# Patient Record
Sex: MALE
[De-identification: names, ages, dates, MRNs, and addresses within clinical notes are randomized per-mention and may not be internally consistent; named-entity substitution may affect disease eponyms.]

---

## 2021-01-08 ENCOUNTER — APPOINTMENT (OUTPATIENT)
Dept: INTERNAL MEDICINE | Facility: CLINIC | Age: 76
End: 2021-01-08
Payer: MEDICARE

## 2021-01-08 ENCOUNTER — NON-APPOINTMENT (OUTPATIENT)
Age: 76
End: 2021-01-08

## 2021-01-08 VITALS
SYSTOLIC BLOOD PRESSURE: 148 MMHG | HEART RATE: 66 BPM | WEIGHT: 189.2 LBS | OXYGEN SATURATION: 96 % | TEMPERATURE: 97.1 F | DIASTOLIC BLOOD PRESSURE: 72 MMHG | BODY MASS INDEX: 31.52 KG/M2 | HEIGHT: 65 IN

## 2021-01-08 VITALS — SYSTOLIC BLOOD PRESSURE: 114 MMHG | DIASTOLIC BLOOD PRESSURE: 62 MMHG

## 2021-01-08 DIAGNOSIS — Z00.00 ENCOUNTER FOR GENERAL ADULT MEDICAL EXAMINATION W/OUT ABNORMAL FINDINGS: ICD-10-CM

## 2021-01-08 DIAGNOSIS — F17.200 NICOTINE DEPENDENCE, UNSPECIFIED, UNCOMPLICATED: ICD-10-CM

## 2021-01-08 PROCEDURE — G0439: CPT

## 2021-01-08 PROCEDURE — 99072 ADDL SUPL MATRL&STAF TM PHE: CPT

## 2021-01-08 RX ORDER — ALENDRONATE SODIUM 70 MG/1
70 TABLET ORAL
Refills: 0 | Status: ACTIVE | COMMUNITY
Start: 2021-01-08

## 2021-01-08 RX ORDER — ESCITALOPRAM OXALATE 20 MG/1
20 TABLET ORAL
Refills: 0 | Status: ACTIVE | COMMUNITY
Start: 2021-01-08

## 2021-01-08 RX ORDER — AMLODIPINE BESYLATE 10 MG/1
10 TABLET ORAL
Qty: 90 | Refills: 1 | Status: ACTIVE | COMMUNITY
Start: 2021-01-08

## 2021-01-08 RX ORDER — ROSUVASTATIN CALCIUM 40 MG/1
40 TABLET, FILM COATED ORAL
Refills: 0 | Status: ACTIVE | COMMUNITY
Start: 2021-01-08

## 2021-01-08 RX ORDER — SILDENAFIL 100 MG/1
100 TABLET, FILM COATED ORAL
Refills: 0 | Status: ACTIVE | COMMUNITY
Start: 2021-01-08

## 2021-01-08 RX ORDER — CHROMIUM 200 MCG
25 MCG TABLET ORAL
Refills: 0 | Status: ACTIVE | COMMUNITY
Start: 2021-01-08

## 2021-01-08 RX ORDER — ESOMEPRAZOLE MAGNESIUM 20 MG/1
20 CAPSULE, DELAYED RELEASE ORAL
Refills: 0 | Status: ACTIVE | COMMUNITY
Start: 2021-01-08

## 2021-01-08 RX ORDER — METFORMIN HYDROCHLORIDE 500 MG/1
500 TABLET, COATED ORAL
Qty: 30 | Refills: 1 | Status: ACTIVE | COMMUNITY
Start: 2021-01-08

## 2021-01-08 RX ORDER — LISINOPRIL 40 MG/1
40 TABLET ORAL
Refills: 0 | Status: ACTIVE | COMMUNITY
Start: 2021-01-08

## 2021-01-08 NOTE — PHYSICAL EXAM
[No Acute Distress] : no acute distress [Well Nourished] : well nourished [Well Developed] : well developed [Well-Appearing] : well-appearing [Normal Sclera/Conjunctiva] : normal sclera/conjunctiva [PERRL] : pupils equal round and reactive to light [EOMI] : extraocular movements intact [Normal Outer Ear/Nose] : the outer ears and nose were normal in appearance [Normal Oropharynx] : the oropharynx was normal [Supple] : supple [No Respiratory Distress] : no respiratory distress  [No Accessory Muscle Use] : no accessory muscle use [Clear to Auscultation] : lungs were clear to auscultation bilaterally [Normal Rate] : normal rate  [Regular Rhythm] : with a regular rhythm [Normal S1, S2] : normal S1 and S2 [No Edema] : there was no peripheral edema [Soft] : abdomen soft [Non Tender] : non-tender [Non-distended] : non-distended [Normal Bowel Sounds] : normal bowel sounds [Normal Posterior Cervical Nodes] : no posterior cervical lymphadenopathy [Normal Anterior Cervical Nodes] : no anterior cervical lymphadenopathy [No CVA Tenderness] : no CVA  tenderness [No Spinal Tenderness] : no spinal tenderness [No Joint Swelling] : no joint swelling [Grossly Normal Strength/Tone] : grossly normal strength/tone [No Rash] : no rash [Coordination Grossly Intact] : coordination grossly intact [No Focal Deficits] : no focal deficits [Normal Gait] : normal gait [Normal Affect] : the affect was normal [Normal Insight/Judgement] : insight and judgment were intact [Alert and Oriented x3] : oriented to person, place, and time

## 2021-01-12 NOTE — HEALTH RISK ASSESSMENT
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

## 2021-01-12 NOTE — HISTORY OF PRESENT ILLNESS
[de-identified] : \par 75 year old male with a history of hypertension, hyperlipidemia, diabetes, s/p back surgery in 2020, osteoporosis presents for evaluation and to establish care. He feels well and denies any chest pain, shortness of breath, palpitations, headaches/dizziness, fever/chills, N/V/ abdominal pain. He states he has been experiencing urinary incontinence/urgency. He has been taking Oxybutynin but it has not been working for him.

## 2021-01-12 NOTE — PLAN
[FreeTextEntry1] : \par 1. Annual Physical Exam\par complete bloodwork today, including COVID antibodies\par UTD with flu vaccine, PNA vaccine and colonoscopy\par \par 2. Diabetes\par last eye exam 6 months ago\par cont. Metformin 500mg daily\par low carb/ low sugar diet\par \par 3. Hypertension\par cont. Amlodipine 10mg daily\par Lisinopril 40mg daily\par low sodium diet\par \par 4. Hyperlipidemia\par cont. Rosuvastatin 40mg daily\par low fat/ low cholesterol diet\par \par 5. Urinary Incontinence\par cont. Oxybutynin\par urology referral given\par UA/  UC today

## 2021-01-19 LAB
25(OH)D3 SERPL-MCNC: 49 NG/ML
ALBUMIN SERPL ELPH-MCNC: 4.9 G/DL
ALP BLD-CCNC: 46 U/L
ALT SERPL-CCNC: 11 U/L
ANION GAP SERPL CALC-SCNC: 16 MMOL/L
APPEARANCE: CLEAR
AST SERPL-CCNC: 16 U/L
BACTERIA UR CULT: ABNORMAL
BACTERIA: NEGATIVE
BASOPHILS # BLD AUTO: 0.08 K/UL
BASOPHILS NFR BLD AUTO: 0.8 %
BILIRUB SERPL-MCNC: 0.3 MG/DL
BILIRUBIN URINE: NEGATIVE
BLOOD URINE: ABNORMAL
BUN SERPL-MCNC: 26 MG/DL
CALCIUM SERPL-MCNC: 10.2 MG/DL
CHLORIDE SERPL-SCNC: 101 MMOL/L
CHOLEST SERPL-MCNC: 152 MG/DL
CO2 SERPL-SCNC: 25 MMOL/L
COLOR: NORMAL
CREAT SERPL-MCNC: 1.28 MG/DL
EOSINOPHIL # BLD AUTO: 0.39 K/UL
EOSINOPHIL NFR BLD AUTO: 4.1 %
ESTIMATED AVERAGE GLUCOSE: 137 MG/DL
GLUCOSE QUALITATIVE U: NEGATIVE
GLUCOSE SERPL-MCNC: 101 MG/DL
HBA1C MFR BLD HPLC: 6.4 %
HCT VFR BLD CALC: 46.6 %
HDLC SERPL-MCNC: 47 MG/DL
HGB BLD-MCNC: 14.6 G/DL
HYALINE CASTS: 0 /LPF
IMM GRANULOCYTES NFR BLD AUTO: 0.3 %
KETONES URINE: NEGATIVE
LDLC SERPL CALC-MCNC: 64 MG/DL
LEUKOCYTE ESTERASE URINE: NEGATIVE
LYMPHOCYTES # BLD AUTO: 3.91 K/UL
LYMPHOCYTES NFR BLD AUTO: 41.4 %
MAN DIFF?: NORMAL
MCHC RBC-ENTMCNC: 27.6 PG
MCHC RBC-ENTMCNC: 31.3 GM/DL
MCV RBC AUTO: 88.1 FL
MICROSCOPIC-UA: NORMAL
MONOCYTES # BLD AUTO: 0.7 K/UL
MONOCYTES NFR BLD AUTO: 7.4 %
NEUTROPHILS # BLD AUTO: 4.34 K/UL
NEUTROPHILS NFR BLD AUTO: 46 %
NITRITE URINE: NEGATIVE
NONHDLC SERPL-MCNC: 105 MG/DL
PH URINE: 6
PLATELET # BLD AUTO: 226 K/UL
POTASSIUM SERPL-SCNC: 4.4 MMOL/L
PROT SERPL-MCNC: 7.9 G/DL
PROTEIN URINE: NEGATIVE
RBC # BLD: 5.29 M/UL
RBC # FLD: 13.4 %
RED BLOOD CELLS URINE: 3 /HPF
SARS-COV-2 IGG SERPL IA-ACNC: <0.1 INDEX
SARS-COV-2 IGG SERPL QL IA: NEGATIVE
SODIUM SERPL-SCNC: 141 MMOL/L
SPECIFIC GRAVITY URINE: 1.02
SQUAMOUS EPITHELIAL CELLS: 1 /HPF
TRIGL SERPL-MCNC: 206 MG/DL
TSH SERPL-ACNC: 1.45 UIU/ML
UROBILINOGEN URINE: NORMAL
VIT B12 SERPL-MCNC: 790 PG/ML
WBC # FLD AUTO: 9.45 K/UL
WHITE BLOOD CELLS URINE: 0 /HPF

## 2021-01-22 ENCOUNTER — TRANSCRIPTION ENCOUNTER (OUTPATIENT)
Age: 76
End: 2021-01-22

## 2021-01-26 RX ORDER — BLOOD-GLUCOSE METER
W/DEVICE KIT MISCELLANEOUS
Qty: 1 | Refills: 1 | Status: ACTIVE | COMMUNITY
Start: 2021-01-25 | End: 1900-01-01

## 2021-01-26 RX ORDER — LANCETS
EACH MISCELLANEOUS
Qty: 1 | Refills: 1 | Status: ACTIVE | COMMUNITY
Start: 2021-01-25 | End: 1900-01-01

## 2021-01-26 RX ORDER — BLOOD-GLUCOSE METER
KIT MISCELLANEOUS
Qty: 1 | Refills: 3 | Status: ACTIVE | COMMUNITY
Start: 2021-01-25 | End: 1900-01-01

## 2021-02-18 ENCOUNTER — APPOINTMENT (OUTPATIENT)
Dept: UROLOGY | Facility: CLINIC | Age: 76
End: 2021-02-18
Payer: MEDICARE

## 2021-02-18 VITALS
HEART RATE: 74 BPM | SYSTOLIC BLOOD PRESSURE: 146 MMHG | HEIGHT: 66 IN | WEIGHT: 189 LBS | DIASTOLIC BLOOD PRESSURE: 76 MMHG | TEMPERATURE: 97.8 F | BODY MASS INDEX: 30.37 KG/M2 | RESPIRATION RATE: 17 BRPM

## 2021-02-18 DIAGNOSIS — R32 UNSPECIFIED URINARY INCONTINENCE: ICD-10-CM

## 2021-02-18 PROCEDURE — 99072 ADDL SUPL MATRL&STAF TM PHE: CPT

## 2021-02-18 PROCEDURE — 99205 OFFICE O/P NEW HI 60 MIN: CPT

## 2021-02-18 PROCEDURE — 88112 CYTOPATH CELL ENHANCE TECH: CPT | Mod: 26

## 2021-02-20 LAB
APPEARANCE: CLEAR
BACTERIA UR CULT: NORMAL
BACTERIA: NEGATIVE
BILIRUBIN URINE: NEGATIVE
BLOOD URINE: ABNORMAL
COLOR: YELLOW
GLUCOSE QUALITATIVE U: NEGATIVE
HYALINE CASTS: 0 /LPF
KETONES URINE: NEGATIVE
LEUKOCYTE ESTERASE URINE: ABNORMAL
MICROSCOPIC-UA: NORMAL
NITRITE URINE: NEGATIVE
PH URINE: 6
PROTEIN URINE: ABNORMAL
PSA SERPL-MCNC: 1.67 NG/ML
RED BLOOD CELLS URINE: 4 /HPF
SPECIFIC GRAVITY URINE: 1.02
SQUAMOUS EPITHELIAL CELLS: 4 /HPF
URINE CYTOLOGY: NORMAL
UROBILINOGEN URINE: NORMAL
WHITE BLOOD CELLS URINE: 59 /HPF

## 2021-02-20 NOTE — LETTER BODY
[Dear  ___] : Dear  [unfilled], [Consult Letter:] : I had the pleasure of evaluating your patient, [unfilled]. [Please see my note below.] : Please see my note below. [Consult Closing:] : Thank you very much for allowing me to participate in the care of this patient.  If you have any questions, please do not hesitate to contact me. [Sincerely,] : Sincerely, [FreeTextEntry2] : Dr. Susanna Templeton\[facility] [address] [place] [address]\[place] [id number] [FreeTextEntry1] : 02/18/2021: Mr. Torres is a 76y/o M presenting today for evaluation of urinary incontinence. Admits urinary urgency and incontinence which has worsened in the past 6 months. Also complains of burning on tip of penis when urinating. Admits weak urinary stream. Wakes 2-4x at night to urinate. Has had cystoscopy in the past which he tolerated. On 1/8/21, pt was given Augmentin by PCP due to positive growth in urine culture, although patient was not symptomatic. Not sexually active due to weak sexual function. Has tried Tadalafil without improvement. Had procedure on prostate about 10 years ago which has since weakened his sexual function. Pt reports his testosterone is low. H/o diabetes, 1/8/21 HbA1c was 6.4. No h/o kidney stones. No FHx of lung or bladder cancer. Former smoker for 30 years. \par \par Patient will schedule for cystoscopy to evaluate his urinary incontinence. \par \par I prescribed the patient Tamsulosin 0.4 mg once daily half hour after a meal for urinary incontinence. I advised the patient the side effects of nasal congestion, light-headedness, and lack of seminal emission. \par \par Defer digital rectal exam until urine results confirm there is no infection.\par \par The patient produced a urine sample which will be sent for urinalysis, urine cytology, and urine culture. \par Blood work today includes bioavailable testosterone. \par \par RTO in 1 week for cystoscopy.  [FreeTextEntry3] : Neto Sterling MD, PhD

## 2021-02-20 NOTE — ASSESSMENT
[FreeTextEntry1] : 02/18/2021: Mr. Torres is a 76y/o M presenting today for evaluation of urinary incontinence. Admits urinary urgency and incontinence which has worsened in the past 6 months. Also complains of burning on tip of penis when urinating. Admits weak urinary stream. Wakes 2-4x at night to urinate. Has had cystoscopy in the past which he tolerated. On 1/8/21, pt was given Augmentin by PCP due to positive growth in urine culture, although patient was not symptomatic. Not sexually active due to weak sexual function. Has tried Tadalafil without improvement. Had procedure on prostate about 10 years ago which has since weakened his sexual function. Pt reports his testosterone is low. H/o diabetes, 1/8/21 HbA1c was 6.4. No h/o kidney stones. No FHx of lung or bladder cancer. Former smoker for 30 years. \par \par Patient will schedule for cystoscopy to evaluate his urinary incontinence. \par \par I prescribed the patient Tamsulosin 0.4 mg once daily half hour after a meal for urinary incontinence. I advised the patient the side effects of nasal congestion, light-headedness, and lack of seminal emission. \par \par Defer digital rectal exam until urine results confirm there is no infection.\par \par The patient produced a urine sample which will be sent for urinalysis, urine cytology, and urine culture. \par Blood work today includes bioavailable testosterone. \par \par Urinalysis from today shows Pyuria.\par \par RTO in 1 week for cystoscopy. \par \par Preparation, in-person office visit, and coordination of care took: 60 minutes

## 2021-02-20 NOTE — LETTER HEADER
[FreeTextEntry3] : Neto Sterling MD, PhD\par [facility]\par [address]\par [address] [place] [address]\[place] [address]

## 2021-02-20 NOTE — ADDENDUM
[FreeTextEntry1] : I, Akilah Bhatiain, acted solely as a scribe for Dr. Neto Sterling on this date 02/18/2021.\par \par All medical record entries made by the Scribe were at my, Dr. Neto Sterling, direction and personally dictated by me on 02/18/2021. I have reviewed the chart and agree that the record accurately reflects my personal performance of the history, physical exam, assessment and plan.  I have also personally directed, reviewed and agreed with the chart.

## 2021-02-20 NOTE — PHYSICAL EXAM
[General Appearance - Well Developed] : well developed [Normal Appearance] : normal appearance [General Appearance - In No Acute Distress] : no acute distress [Edema] : no peripheral edema [Respiration, Rhythm And Depth] : normal respiratory rhythm and effort [Exaggerated Use Of Accessory Muscles For Inspiration] : no accessory muscle use [Abdomen Soft] : soft [Abdomen Tenderness] : non-tender [Normal Station and Gait] : the gait and station were normal for the patient's age [] : no rash [No Focal Deficits] : no focal deficits [Oriented To Time, Place, And Person] : oriented to person, place, and time [Affect] : the affect was normal [Mood] : the mood was normal [Not Anxious] : not anxious [FreeTextEntry1] : wears eyeglasses

## 2021-02-20 NOTE — HISTORY OF PRESENT ILLNESS
[FreeTextEntry1] : 02/18/2021: Mr. Torres is a 74y/o M presenting today for evaluation of urinary incontinence. Admits urinary urgency and incontinence which has worsened in the past 6 months. Also complains of burning on tip of penis when urinating. Admits weak urinary stream. Wakes 2-4x at night to urinate. Has had cystoscopy in the past which he tolerated. On 1/8/21, pt was given Augmentin by PCP due to positive growth in urine culture, although patient was not symptomatic. Not sexually active due to weak sexual function. Has tried Tadalafil without improvement. Had procedure on prostate about 10 years ago which has since weakened his sexual function. Pt reports his testosterone is low. H/o diabetes, 1/8/21 HbA1c was 6.4. No h/o kidney stones. No FHx of lung or bladder cancer. Former smoker for 30 years.

## 2021-02-24 ENCOUNTER — NON-APPOINTMENT (OUTPATIENT)
Age: 76
End: 2021-02-24

## 2021-02-25 ENCOUNTER — NON-APPOINTMENT (OUTPATIENT)
Age: 76
End: 2021-02-25

## 2021-02-25 ENCOUNTER — APPOINTMENT (OUTPATIENT)
Dept: UROLOGY | Facility: CLINIC | Age: 76
End: 2021-02-25
Payer: MEDICARE

## 2021-02-25 ENCOUNTER — OUTPATIENT (OUTPATIENT)
Dept: OUTPATIENT SERVICES | Facility: HOSPITAL | Age: 76
LOS: 1 days | End: 2021-02-25
Payer: MEDICARE

## 2021-02-25 VITALS — TEMPERATURE: 96.7 F | DIASTOLIC BLOOD PRESSURE: 77 MMHG | HEART RATE: 77 BPM | SYSTOLIC BLOOD PRESSURE: 155 MMHG

## 2021-02-25 DIAGNOSIS — R35.0 FREQUENCY OF MICTURITION: ICD-10-CM

## 2021-02-25 LAB
TESTOST BND SERPL-MCNC: 8.74 NG/DL
TESTOSTERONE BIOAVAILABLE: 90 NG/DL
TESTOSTERONE TOTAL S: 257 NG/DL

## 2021-02-25 PROCEDURE — 52000 CYSTOURETHROSCOPY: CPT

## 2021-02-25 PROCEDURE — 88112 CYTOPATH CELL ENHANCE TECH: CPT | Mod: 26

## 2021-02-25 PROCEDURE — 99214 OFFICE O/P EST MOD 30 MIN: CPT | Mod: 25

## 2021-02-25 PROCEDURE — 99072 ADDL SUPL MATRL&STAF TM PHE: CPT

## 2021-02-28 LAB
APPEARANCE: CLEAR
BACTERIA UR CULT: NORMAL
BACTERIA: NEGATIVE
BILIRUBIN URINE: NEGATIVE
BLOOD URINE: NORMAL
COLOR: NORMAL
GLUCOSE QUALITATIVE U: NEGATIVE
HYALINE CASTS: 0 /LPF
KETONES URINE: NEGATIVE
LEUKOCYTE ESTERASE URINE: NEGATIVE
MICROSCOPIC-UA: NORMAL
NITRITE URINE: NEGATIVE
PH URINE: 6
PROTEIN URINE: NORMAL
RED BLOOD CELLS URINE: 1 /HPF
SPECIFIC GRAVITY URINE: 1.02
SQUAMOUS EPITHELIAL CELLS: 1 /HPF
UROBILINOGEN URINE: NORMAL
WHITE BLOOD CELLS URINE: 1 /HPF

## 2021-03-02 ENCOUNTER — NON-APPOINTMENT (OUTPATIENT)
Age: 76
End: 2021-03-02

## 2021-03-02 LAB
APPEARANCE: CLEAR
BACTERIA: NEGATIVE
BILIRUBIN URINE: NEGATIVE
BLOOD URINE: NEGATIVE
COLOR: NORMAL
GLUCOSE QUALITATIVE U: NEGATIVE
HYALINE CASTS: 0 /LPF
KETONES URINE: NEGATIVE
LEUKOCYTE ESTERASE URINE: NEGATIVE
MICROSCOPIC-UA: NORMAL
NITRITE URINE: NEGATIVE
PH URINE: 6
PROTEIN URINE: NEGATIVE
RED BLOOD CELLS URINE: 2 /HPF
SPECIFIC GRAVITY URINE: 1.02
SQUAMOUS EPITHELIAL CELLS: 0 /HPF
URINE CYTOLOGY: NORMAL
URINE CYTOLOGY: NORMAL
UROBILINOGEN URINE: NORMAL
WHITE BLOOD CELLS URINE: 1 /HPF

## 2021-03-03 ENCOUNTER — NON-APPOINTMENT (OUTPATIENT)
Age: 76
End: 2021-03-03

## 2021-03-03 LAB — BACTERIA UR CULT: NORMAL

## 2021-03-06 NOTE — HISTORY OF PRESENT ILLNESS
[FreeTextEntry1] : 02/18/2021: Mr. Torres is a 76y/o M presenting today for evaluation of urinary incontinence. Admits urinary urgency and incontinence which has worsened in the past 6 months. Also complains of burning on tip of penis when urinating. Admits weak urinary stream. Wakes 2-4x at night to urinate. Has had cystoscopy in the past which he tolerated. On 1/8/21, pt was given Augmentin by PCP due to positive growth in urine culture, although patient was not symptomatic. Not sexually active due to weak sexual function. Has tried Tadalafil without improvement. Had procedure on prostate about 10 years ago which has since weakened his sexual function. Pt reports his testosterone is low. H/o diabetes, 1/8/21 HbA1c was 6.4. No h/o kidney stones. No FHx of lung or bladder cancer. Former smoker for 30 years. \par \par 02/25/2021: Patient presents today for cystoscopy. Feels burning after the cystoscopy and was only able to urinate a small volume. Hx of DM. Reviewed 2/18/21 urine tests. UA showed pyuria, UC&S no growth.

## 2021-03-06 NOTE — ASSESSMENT
[FreeTextEntry1] : 02/18/2021: Mr. Torres is a 76y/o M presenting today for evaluation of urinary incontinence. Admits urinary urgency and incontinence which has worsened in the past 6 months. Also complains of burning on tip of penis when urinating. Admits weak urinary stream. Wakes 2-4x at night to urinate. Has had cystoscopy in the past which he tolerated. On 1/8/21, pt was given Augmentin by PCP due to positive growth in urine culture, although patient was not symptomatic. Not sexually active due to weak sexual function. Has tried Tadalafil without improvement. Had procedure on prostate about 10 years ago which has since weakened his sexual function. Pt reports his testosterone is low. H/o diabetes, 1/8/21 HbA1c was 6.4. No h/o kidney stones. No FHx of lung or bladder cancer. Former smoker for 30 years. \par \par 02/25/2021: Patient presents today for cystoscopy. Feels burning after the cystoscopy and was only able to urinate a small volume. Hx of DM. Reviewed 2/18/21 urine tests. UA showed pyuria, UC&S no growth. \par \par Cystoscopy findings: Bladder mucosa is normal pale pink. Scope is retroflexed. Prostate does protrude into the bladder. Bladder neck has been previously resected. Trigone is visible. Bladder is 2+ trabeculated. Good capacity. R ureteral orifice is a slit and close to the prostate. Left ureteral orifice is a slit and close to the prostate. No bladder tumors or stones. Slight prostate urethral glandularis posteriorly near bladder neck. Bladder neck is open. No urethral strictures, tumors, inflammation, or stones. \par \par Two tablets of Bactrim are provided. Patient is to take one tablet now and one tablet tonight to limit their risk of infection. \par \par Patient was able to urinate later in the day after the procedure without issue. \par \par The patient produced a urine sample which will be sent for urinalysis, urine cytology, and urine culture. \par \par Preparation, in-person office visit, and coordination of care took: 30 minutes

## 2021-03-06 NOTE — ADDENDUM
[FreeTextEntry1] : I, Aiklah Bhatiain, acted solely as a scribe for Dr. Neto Sterling on this date 02/25/2021.\par \par All medical record entries made by the Scribe were at my, Dr. Neto Sterling, direction and personally dictated by me on 02/25/2021. I have reviewed the chart and agree that the record accurately reflects my personal performance of the history, physical exam, assessment and plan.  I have also personally directed, reviewed and agreed with the chart.

## 2021-03-08 DIAGNOSIS — N52.1 ERECTILE DYSFUNCTION DUE TO DISEASES CLASSIFIED ELSEWHERE: ICD-10-CM

## 2021-03-08 DIAGNOSIS — R82.81 PYURIA: ICD-10-CM

## 2021-03-08 DIAGNOSIS — N39.0 URINARY TRACT INFECTION, SITE NOT SPECIFIED: ICD-10-CM

## 2021-03-08 DIAGNOSIS — E11.9 TYPE 2 DIABETES MELLITUS WITHOUT COMPLICATIONS: ICD-10-CM

## 2021-03-08 DIAGNOSIS — N39.41 URGE INCONTINENCE: ICD-10-CM

## 2021-03-12 ENCOUNTER — APPOINTMENT (OUTPATIENT)
Dept: UROLOGY | Facility: CLINIC | Age: 76
End: 2021-03-12
Payer: MEDICARE

## 2021-03-12 PROCEDURE — 99442: CPT

## 2021-03-12 RX ORDER — TAMSULOSIN HYDROCHLORIDE 0.4 MG/1
0.4 CAPSULE ORAL
Qty: 30 | Refills: 0 | Status: ACTIVE | COMMUNITY
Start: 2021-02-18 | End: 1900-01-01

## 2021-03-12 NOTE — ASSESSMENT
[FreeTextEntry1] : 02/18/2021: Mr. Torres is a 76y/o M presenting today for evaluation of urinary incontinence. Admits urinary urgency and incontinence which has worsened in the past 6 months. Also complains of burning on tip of penis when urinating. Admits weak urinary stream. Wakes 2-4x at night to urinate. Has had cystoscopy in the past which he tolerated. On 1/8/21, pt was given Augmentin by PCP due to positive growth in urine culture, although patient was not symptomatic. Not sexually active due to weak sexual function. Has tried Tadalafil without improvement. Had procedure on prostate about 10 years ago which has since weakened his sexual function. Pt reports his testosterone is low. H/o diabetes, 1/8/21 HbA1c was 6.4. No h/o kidney stones. No FHx of lung or bladder cancer. Former smoker for 30 years. \par \par 02/25/2021: Patient presents today for cystoscopy. Feels burning after the cystoscopy and was only able to urinate a small volume. Hx of DM. Reviewed 2/18/21 urine tests. UA showed pyuria, UC&S no growth. \par \par Cystoscopy findings: Bladder mucosa is normal pale pink. Scope is retroflexed. Prostate does protrude into the bladder. Bladder neck has been previously resected. Trigone is visible. Bladder is 2+ trabeculated. Good capacity. R ureteral orifice is a slit and close to the prostate. Left ureteral orifice is a slit and close to the prostate. No bladder tumors or stones. Slight prostate urethral glandularis posteriorly near bladder neck. Bladder neck is open. No urethral strictures, tumors, inflammation, or stones. \par \par Two tablets of Bactrim are provided. Patient is to take one tablet now and one tablet tonight to limit their risk of infection. \par Patient was able to urinate later in the day after the procedure without issue. \par The patient produced a urine sample which will be sent for urinalysis, urine cytology, and urine culture. \par \par 03/12/2021: The patient presents today for a telehealth visit. [facility] was having some problems, but [facility] 10 [facility] once daily which gave him improvement. He continues to take tamsulosin 0.4 mg once daily. Pt reports he had to wear diapers after the cystoscopy, but his urination is about 80% improved. The patient is still interested in something for his erections once his urinary symptoms are under control. \par \par I increased the dosage of Tamsulosin 0.4 mg from once daily to twice daily. \par \par Patient will have a telehealth visit in 2 weeks to evaluate the efficacy of tamsulosin BID. \par \par Preparation, telephone visit, and coordination of care took : 11 minutes.

## 2021-03-12 NOTE — HISTORY OF PRESENT ILLNESS
[FreeTextEntry1] : 02/18/2021: Mr. Torres is a 76y/o M presenting today for evaluation of urinary incontinence. Admits urinary urgency and incontinence which has worsened in the past 6 months. Also complains of burning on tip of penis when urinating. Admits weak urinary stream. Wakes 2-4x at night to urinate. Has had cystoscopy in the past which he tolerated. On 1/8/21, pt was given Augmentin by PCP due to positive growth in urine culture, although patient was not symptomatic. Not sexually active due to weak sexual function. Has tried Tadalafil without improvement. Had procedure on prostate about 10 years ago which has since weakened his sexual function. Pt reports his testosterone is low. H/o diabetes, 1/8/21 HbA1c was 6.4. No h/o kidney stones. No FHx of lung or bladder cancer. Former smoker for 30 years. \par \par 02/25/2021: Patient presents today for cystoscopy. Feels burning after the cystoscopy and was only able to urinate a small volume. Hx of DM. Reviewed 2/18/21 urine tests. UA showed pyuria, UC&S no growth. \par \par 03/12/2021: The patient presents today for a telehealth visit. Pt was having some problems, but [facility] 10 mg once daily which gave him improvement. He continues to take tamsulosin 0.4 mg once daily. Pt reports he had to wear diapers after the cystoscopy, but his urination is about 80% improved. The patient is still interested in something for his erections once his urinary symptoms are under control.

## 2021-03-12 NOTE — ADDENDUM
[FreeTextEntry1] : This note was authored by Esperanza Berg working as a scribe for Dr.Gary Sterling. I, Dr. Neto Sterling have reviewed the content of this note and confirm it is true and accurate. I personally performed the history and physical examination and made all the decisions 03/12/2021.

## 2021-03-19 LAB — FUNGUS SPEC CULT ORG #8: NORMAL

## 2021-04-08 ENCOUNTER — APPOINTMENT (OUTPATIENT)
Dept: UROLOGY | Facility: CLINIC | Age: 76
End: 2021-04-08
Payer: MEDICARE

## 2021-04-08 DIAGNOSIS — N39.0 URINARY TRACT INFECTION, SITE NOT SPECIFIED: ICD-10-CM

## 2021-04-08 DIAGNOSIS — N39.41 URGE INCONTINENCE: ICD-10-CM

## 2021-04-08 DIAGNOSIS — N40.0 BENIGN PROSTATIC HYPERPLASIA WITHOUT LOWER URINARY TRACT SYMPMS: ICD-10-CM

## 2021-04-08 DIAGNOSIS — N52.1 ERECTILE DYSFUNCTION DUE TO DISEASES CLASSIFIED ELSEWHERE: ICD-10-CM

## 2021-04-08 DIAGNOSIS — A49.9 URINARY TRACT INFECTION, SITE NOT SPECIFIED: ICD-10-CM

## 2021-04-08 DIAGNOSIS — R82.81 PYURIA: ICD-10-CM

## 2021-04-08 PROCEDURE — 99072 ADDL SUPL MATRL&STAF TM PHE: CPT

## 2021-04-08 PROCEDURE — 88112 CYTOPATH CELL ENHANCE TECH: CPT | Mod: 26

## 2021-04-08 PROCEDURE — 99214 OFFICE O/P EST MOD 30 MIN: CPT

## 2021-04-08 RX ORDER — OXYBUTYNIN CHLORIDE 15 MG/1
15 TABLET, EXTENDED RELEASE ORAL DAILY
Qty: 30 | Refills: 11 | Status: ACTIVE | COMMUNITY
Start: 2021-01-08 | End: 1900-01-01

## 2021-04-11 LAB
APPEARANCE: CLEAR
BACTERIA UR CULT: NORMAL
BACTERIA: NEGATIVE
BILIRUBIN URINE: NEGATIVE
BLOOD URINE: NORMAL
COLOR: NORMAL
GLUCOSE QUALITATIVE U: NEGATIVE
HYALINE CASTS: 0 /LPF
KETONES URINE: NEGATIVE
LEUKOCYTE ESTERASE URINE: NEGATIVE
MICROSCOPIC-UA: NORMAL
NITRITE URINE: NEGATIVE
PH URINE: 7
PROTEIN URINE: NEGATIVE
RED BLOOD CELLS URINE: 2 /HPF
SPECIFIC GRAVITY URINE: 1.01
SQUAMOUS EPITHELIAL CELLS: 0 /HPF
URINE CYTOLOGY: NORMAL
UROBILINOGEN URINE: NORMAL
WHITE BLOOD CELLS URINE: 0 /HPF

## 2021-04-20 LAB — ACID FAST STN UR: NORMAL

## 2021-04-24 LAB
ACID FAST STN UR: NORMAL

## 2021-05-01 NOTE — ASSESSMENT
[FreeTextEntry1] : 02/18/2021: Mr. Torres is a 74y/o M presenting today for evaluation of urinary incontinence. Admits urinary urgency and incontinence which has worsened in the past 6 months. Also complains of burning on tip of penis when urinating. Admits weak urinary stream. Wakes 2-4x at night to urinate. Has had cystoscopy in the past which he tolerated. On 1/8/21, pt was given Augmentin by PCP due to positive growth in urine culture, although patient was not symptomatic. Not sexually active due to weak sexual function. Has tried Tadalafil without improvement. Had procedure on prostate about 10 years ago which has since weakened his sexual function. Pt reports his testosterone is low. H/o diabetes, 1/8/21 HbA1c was 6.4. No h/o kidney stones. No FHx of lung or bladder cancer. Former smoker for 30 years. \par \par 02/25/2021: Patient presents today for cystoscopy. Feels burning after the cystoscopy and was only able to urinate a small volume. Hx of DM. Reviewed 2/18/21 urine tests. UA showed pyuria, UC&S no growth. \par \par 03/12/2021: The patient presents today for a telehealth visit. Pt was having some problems, but [facility] 10 mg once daily which gave him improvement. He continues to take tamsulosin 0.4 mg once daily. Pt reports he had to wear diapers after the cystoscopy, but his urination is about 80% improved. The patient is still interested in something for his erections once his urinary symptoms are under control. \par \par 04/08/2021: Patient presents today for follow up. Last visit, pt was increased on Tamsulosin to BID to decrease urinary urgency, but did not have any improvement. Takes Oxybutynin ER 10mg which does help, but still having urinary urgency though not as bad as before. No constipation or dry mouth. On Sildenafil 100mg one hour prior to sexual activity, however erections continue to be weak. No side effects. \par \par Increased Oxybutynin ER from 10mg to 15mg once daily. \par Increase Sildenafil 100mg to 1.5 tablets one hour prior to sexual activity. \par \par The patient produced a urine sample which will be sent for urinalysis, urine cytology, and urine culture. \par \par RTO in 3 weeks for reassessment. \par \par Preparation, in-person office visit, and coordination of care took: 30 minutes

## 2021-05-01 NOTE — ADDENDUM
[FreeTextEntry1] : I, Akilah Bhatiain, acted solely as a scribe for Dr. Neto Sterling on this date 04/08/2021.\par \par All medical record entries made by the Scribe were at my, Dr. Neto Sterling, direction and personally dictated by me on 04/08/2021. I have reviewed the chart and agree that the record accurately reflects my personal performance of the history, physical exam, assessment and plan.  I have also personally directed, reviewed and agreed with the chart.

## 2021-05-01 NOTE — HISTORY OF PRESENT ILLNESS
[FreeTextEntry1] : 02/18/2021: Mr. Torres is a 74y/o M presenting today for evaluation of urinary incontinence. Admits urinary urgency and incontinence which has worsened in the past 6 months. Also complains of burning on tip of penis when urinating. Admits weak urinary stream. Wakes 2-4x at night to urinate. Has had cystoscopy in the past which he tolerated. On 1/8/21, pt was given Augmentin by PCP due to positive growth in urine culture, although patient was not symptomatic. Not sexually active due to weak sexual function. Has tried Tadalafil without improvement. Had procedure on prostate about 10 years ago which has since weakened his sexual function. Pt reports his testosterone is low. H/o diabetes, 1/8/21 HbA1c was 6.4. No h/o kidney stones. No FHx of lung or bladder cancer. Former smoker for 30 years. \par \par 02/25/2021: Patient presents today for cystoscopy. Feels burning after the cystoscopy and was only able to urinate a small volume. Hx of DM. Reviewed 2/18/21 urine tests. UA showed pyuria, UC&S no growth. \par \par 03/12/2021: The patient presents today for a telehealth visit. Pt was having some problems, but [facility] 10 mg once daily which gave him improvement. He continues to take tamsulosin 0.4 mg once daily. Pt reports he had to wear diapers after the cystoscopy, but his urination is about 80% improved. The patient is still interested in something for his erections once his urinary symptoms are under control. \par \par 04/08/2021: Patient presents today for follow up. Last visit, pt was increased on Tamsulosin to BID to decrease urinary urgency, but did not have any improvement. Takes Oxybutynin ER 10mg which does help, but still having urinary urgency though not as bad as before. No constipation or dry mouth. On Sildenafil 100mg one hour prior to sexual activity, however erections continue to be weak. No side effects.

## 2021-05-29 LAB — ACID FAST STN UR: NORMAL

## 2021-10-22 ENCOUNTER — APPOINTMENT (OUTPATIENT)
Dept: INTERNAL MEDICINE | Facility: CLINIC | Age: 76
End: 2021-10-22
Payer: MEDICARE

## 2021-10-22 VITALS
TEMPERATURE: 96.8 F | RESPIRATION RATE: 14 BRPM | DIASTOLIC BLOOD PRESSURE: 72 MMHG | OXYGEN SATURATION: 95 % | SYSTOLIC BLOOD PRESSURE: 114 MMHG | HEART RATE: 85 BPM | BODY MASS INDEX: 30.51 KG/M2 | WEIGHT: 189 LBS

## 2021-10-22 DIAGNOSIS — E11.9 TYPE 2 DIABETES MELLITUS W/OUT COMPLICATIONS: ICD-10-CM

## 2021-10-22 DIAGNOSIS — M25.511 PAIN IN RIGHT SHOULDER: ICD-10-CM

## 2021-10-22 DIAGNOSIS — E78.5 HYPERLIPIDEMIA, UNSPECIFIED: ICD-10-CM

## 2021-10-22 DIAGNOSIS — I10 ESSENTIAL (PRIMARY) HYPERTENSION: ICD-10-CM

## 2021-10-22 PROCEDURE — 99214 OFFICE O/P EST MOD 30 MIN: CPT

## 2021-10-22 RX ORDER — AMOXICILLIN AND CLAVULANATE POTASSIUM 875; 125 MG/1; MG/1
875-125 TABLET, COATED ORAL TWICE DAILY
Qty: 10 | Refills: 0 | Status: DISCONTINUED | COMMUNITY
Start: 2021-01-19 | End: 2021-10-22

## 2021-10-22 RX ORDER — NAPROXEN 500 MG/1
500 TABLET ORAL
Qty: 40 | Refills: 0 | Status: ACTIVE | COMMUNITY
Start: 2021-10-22 | End: 1900-01-01

## 2021-10-22 NOTE — PHYSICAL EXAM
[No Acute Distress] : no acute distress [Well Nourished] : well nourished [Normal Sclera/Conjunctiva] : normal sclera/conjunctiva [EOMI] : extraocular movements intact [Normal Outer Ear/Nose] : the outer ears and nose were normal in appearance [Normal Oropharynx] : the oropharynx was normal [No JVD] : no jugular venous distention [No Lymphadenopathy] : no lymphadenopathy [Supple] : supple [Thyroid Normal, No Nodules] : the thyroid was normal and there were no nodules present [No Respiratory Distress] : no respiratory distress  [No Accessory Muscle Use] : no accessory muscle use [Clear to Auscultation] : lungs were clear to auscultation bilaterally [Normal Rate] : normal rate  [Regular Rhythm] : with a regular rhythm [Normal S1, S2] : normal S1 and S2 [No Murmur] : no murmur heard [Pedal Pulses Present] : the pedal pulses are present [No Edema] : there was no peripheral edema [No Extremity Clubbing/Cyanosis] : no extremity clubbing/cyanosis [Soft] : abdomen soft [Non Tender] : non-tender [Non-distended] : non-distended [No Masses] : no abdominal mass palpated [Normal Bowel Sounds] : normal bowel sounds [Normal Posterior Cervical Nodes] : no posterior cervical lymphadenopathy [Normal Anterior Cervical Nodes] : no anterior cervical lymphadenopathy [No CVA Tenderness] : no CVA  tenderness [No Spinal Tenderness] : no spinal tenderness [Grossly Normal Strength/Tone] : grossly normal strength/tone [No Rash] : no rash [No Focal Deficits] : no focal deficits [Normal Gait] : normal gait [Deep Tendon Reflexes (DTR)] : deep tendon reflexes were 2+ and symmetric [Normal Affect] : the affect was normal [Normal Insight/Judgement] : insight and judgment were intact [de-identified] : +RT shoulder tenderness with palpation and   ROM, decrease ROM

## 2021-10-22 NOTE — HISTORY OF PRESENT ILLNESS
[de-identified] : 76  year old male with a history of hypertension, hyperlipidemia, diabetes, s/p back surgery in 2020, osteoporosis presents  c/o right shoulder pain\par \par He has been experiencing right shoulder pain  5-10/10 sharp aching pain that radiates from the shoulder  to the neck and down right arm to the elbow for 3 weeks.  Pain worse with physical activities . He has been taking Tylenol without much  relief. Denies any numbness/tingling, weakness in extremities, trauma, heavy lifting or straining\par He reports fair glycemic control at home \par Denies CP, SOB, or abdominal pain

## 2021-10-22 NOTE — PLAN
[FreeTextEntry1] : Acute\par \par 1. Right shoulder pain \par Xray right shoulder\par Naproxen 500 mg twice daily with food\par ortho referral\par gentle stretching\par warm compresses \par 2. DM\par cont Metformin 500 mg QD\par 3. Htn\par cont Amlodipine 10 mg QD\par Lisinopril 40 mg QD\par 4. Hld\par cont Rosuvastatin 40 mg QD\par

## 2023-09-26 NOTE — PHYSICAL EXAM
[Follow-Up: _____] : a [unfilled] follow-up visit Detail Level: Detailed [General Appearance - Well Developed] : well developed X Size Of Lesion In Cm (Optional): 0 [Normal Appearance] : normal appearance Size Of Lesion: 1.8 [General Appearance - In No Acute Distress] : no acute distress [Abdomen Soft] : soft [Abdomen Tenderness] : non-tender [Edema] : no peripheral edema [] : no respiratory distress [Respiration, Rhythm And Depth] : normal respiratory rhythm and effort [Exaggerated Use Of Accessory Muscles For Inspiration] : no accessory muscle use [Oriented To Time, Place, And Person] : oriented to person, place, and time [Affect] : the affect was normal [Mood] : the mood was normal [Not Anxious] : not anxious [No Focal Deficits] : no focal deficits [FreeTextEntry1] : wears eyeglasses